# Patient Record
Sex: FEMALE | Race: WHITE
[De-identification: names, ages, dates, MRNs, and addresses within clinical notes are randomized per-mention and may not be internally consistent; named-entity substitution may affect disease eponyms.]

---

## 2017-09-26 ENCOUNTER — HOSPITAL ENCOUNTER (INPATIENT)
Dept: HOSPITAL 45 - C.EDB | Age: 22
LOS: 3 days | Discharge: HOME | DRG: 885 | End: 2017-09-29
Attending: PSYCHIATRY & NEUROLOGY | Admitting: PSYCHIATRY & NEUROLOGY
Payer: COMMERCIAL

## 2017-09-26 VITALS — TEMPERATURE: 98.42 F | DIASTOLIC BLOOD PRESSURE: 69 MMHG | SYSTOLIC BLOOD PRESSURE: 108 MMHG | HEART RATE: 87 BPM

## 2017-09-26 VITALS
WEIGHT: 157.63 LBS | WEIGHT: 157.63 LBS | HEIGHT: 65 IN | BODY MASS INDEX: 26.26 KG/M2 | HEIGHT: 65 IN | BODY MASS INDEX: 26.26 KG/M2

## 2017-09-26 VITALS — OXYGEN SATURATION: 99 %

## 2017-09-26 DIAGNOSIS — F41.9: ICD-10-CM

## 2017-09-26 DIAGNOSIS — F32.9: ICD-10-CM

## 2017-09-26 DIAGNOSIS — F20.9: Primary | ICD-10-CM

## 2017-09-26 LAB
ALP SERPL-CCNC: 83 U/L (ref 45–117)
ALT SERPL-CCNC: 18 U/L (ref 12–78)
ANION GAP SERPL CALC-SCNC: 8 MMOL/L (ref 3–11)
APAP SERPL-MCNC: < 2 UG/ML (ref 10–30)
APPEARANCE UR: CLEAR
AST SERPL-CCNC: 12 U/L (ref 15–37)
BASOPHILS # BLD: 0.01 K/UL (ref 0–0.2)
BASOPHILS NFR BLD: 0.1 %
BENZODIAZ UR-MCNC: (no result) UG/L
BILIRUB UR-MCNC: (no result) MG/DL
BUN SERPL-MCNC: 5 MG/DL (ref 7–18)
BUN/CREAT SERPL: 6.3 (ref 10–20)
CALCIUM SERPL-MCNC: 8.7 MG/DL (ref 8.5–10.1)
CHLORIDE SERPL-SCNC: 105 MMOL/L (ref 98–107)
CO2 SERPL-SCNC: 27 MMOL/L (ref 21–32)
COLOR UR: (no result)
COMPLETE: YES
CREAT CL PREDICTED SERPL C-G-VRATE: 104.6 ML/MIN
CREAT SERPL-MCNC: 0.83 MG/DL (ref 0.6–1.2)
EOSINOPHIL NFR BLD AUTO: 248 K/UL (ref 130–400)
GLUCOSE SERPL-MCNC: 122 MG/DL (ref 70–99)
HCT VFR BLD CALC: 41.1 % (ref 37–47)
IG%: 0.1 %
IMM GRANULOCYTES NFR BLD AUTO: 37.3 %
LYMPHOCYTES # BLD: 2.55 K/UL (ref 1.2–3.4)
MANUAL MICROSCOPIC REQUIRED?: NO
MCH RBC QN AUTO: 28.9 PG (ref 25–34)
MCHC RBC AUTO-ENTMCNC: 33.6 G/DL (ref 32–36)
MCV RBC AUTO: 86.2 FL (ref 80–100)
MONOCYTES NFR BLD: 10.1 %
NEUTROPHILS # BLD AUTO: 1.9 %
NEUTROPHILS NFR BLD AUTO: 50.5 %
NITRITE UR QL STRIP: (no result)
PCP UR-MCNC: (no result) UG/L
PH UR STRIP: 5 [PH] (ref 4.5–7.5)
PMV BLD AUTO: 9 FL (ref 7.4–10.4)
POTASSIUM SERPL-SCNC: 3.7 MMOL/L (ref 3.5–5.1)
RBC # BLD AUTO: 4.77 M/UL (ref 4.2–5.4)
REVIEW REQ?: NO
SODIUM SERPL-SCNC: 140 MMOL/L (ref 136–145)
SP GR UR STRIP: 1.02 (ref 1–1.03)
TSH SERPL-ACNC: 1.85 UIU/ML (ref 0.3–4.5)
URINE BILL WITH OR WITHOUT MIC: (no result)
URINE EPITHELIAL CELL AUTO: >30 /LPF (ref 0–5)
UROBILINOGEN UR-MCNC: (no result) MG/DL
WBC # BLD AUTO: 6.84 K/UL (ref 4.8–10.8)
ZZUR CULT IF INDIC CLEAN CATCH: YES

## 2017-09-26 RX ADMIN — QUETIAPINE SCH MG: 200 TABLET, EXTENDED RELEASE ORAL at 21:34

## 2017-09-26 RX ADMIN — CITALOPRAM HYDROBROMIDE SCH MG: 20 TABLET ORAL at 09:01

## 2017-09-26 NOTE — EMERGENCY ROOM VISIT NOTE
History


Report prepared by Katherine:  aNt Clark


Under the Supervision of:  Dr. Jeanna Marte M.D.


First contact with patient:  03:22


Chief Complaint:  MENTAL HEALTH EVALUATION


Stated Complaint:  need psych [facility] stay- schizophrenia





History of Present Illness


The patient is a 22 year old female who presents to the Emergency Room with 

complaints of a mental health evaluation. The patient states that she has 

schizophrenia and that she has been off of her medications for two weeks. The 

patient reports that her medication makes her tired so she stopped taking them 

so she could stay up later to do school work. She reports that she took an 

extra pill that she had 3 days ago. She reports that she has also been 

hallucinating clowns recently. The patient denies substance abuse and suicidal 

thoughts.





   Source of History:  patient


   Onset:  today


   Position:  other (global)


   Quality:  other (mental health evaluation )


   Timing:  constant


Note:


additional symptom: hallucinations 


denies: suicidal thoughts





Review of Systems


See HPI for pertinent positives & negatives. A total of 10 systems reviewed and 

were otherwise negative.





Past Medical & Surgical


Medical Problems:


(1) Depression


(2) Schizophrenia


Social History Problems:


(1) Sexually active








Family History





Patient reports no known family medical history.





Social History


Smoking Status:  Never Smoker


Drug Use:  none


Marital Status:  single


Housing Status:  lives with roommate


Occupation Status:  [employer] student





Current/Historical Medications


Scheduled


Citalopram Hydrobromide (Citalopram Hydrobromide), 10 MG PO QAM


Quetiapine Fumarate (Seroquel Xr), 300 MG PO HS





Scheduled PRN


Epinephrine (Epipen), 0.3 MG IM UD PRN for ALLERGIC REACTION


Lorazepam (Ativan), 0.5 MG PO Q6H PRN for Anxiety


Quetiapine Fumarate (Seroquel), 25 MG PO BID PRN for stress





Allergies


Coded Allergies:  


     Shellfish (Verified  Allergy, Severe, flushed and sob, 9/26/17)


 HAS USED EPIPEN WITH REACTION


     Risperidone (Verified  Allergy, Mild, Drops BP, 9/26/17)





Physical Exam


Vital Signs











  Date Time  Temp Pulse Resp B/P (MAP) Pulse Ox O2 Delivery O2 Flow Rate FiO2


 


9/26/17 03:06 36.4 86 20 119/85 99 Room Air  











Physical Exam


Vital signs reviewed.





General: Well-appearing female, in no significant distress. 





HEENT: No scleral icterus, PERRLA, neck supple.  Atraumatic.





Cardiovascular: Regular rate and rhythm, no extra sounds.





Pulmonary: Clear to auscultation bilaterally, normal work of breathing.





Abdomen: Soft, nontender, nondistended, positive bowel sounds.





Musculoskeletal: Atraumatic, no peripheral edema.





Neurologic: Patient awake alert and oriented x 3, full strength in all 4 

extremities.  Cranial nerves 2 through 12 grossly intact.





Skin: Warm, dry, no rash 





Psych: Negative suicidal, negative homicidal.





Medical Decision & Procedures


Laboratory Results


9/26/17 03:28








Red Blood Count 4.77, Mean Corpuscular Volume 86.2, Mean Corpuscular Hemoglobin 

28.9, Mean Corpuscular Hemoglobin Concent 33.6, Mean Platelet Volume 9.0, 

Neutrophils (%) (Auto) 50.5, Lymphocytes (%) (Auto) 37.3, Monocytes (%) (Auto) 

10.1, Eosinophils (%) (Auto) 1.9, Basophils (%) (Auto) 0.1, Neutrophils # (Auto

) 3.45, Lymphocytes # (Auto) 2.55, Monocytes # (Auto) 0.69, Eosinophils # (Auto

) 0.13, Basophils # (Auto) 0.01





9/26/17 03:28

















Test


  9/26/17


03:28 9/26/17


04:15 9/26/17


04:31


 


White Blood Count


  6.84 K/uL


(4.8-10.8) 


  


 


 


Red Blood Count


  4.77 M/uL


(4.2-5.4) 


  


 


 


Hemoglobin


  13.8 g/dL


(12.0-16.0) 


  


 


 


Hematocrit 41.1 % (37-47)   


 


Mean Corpuscular Volume


  86.2 fL


() 


  


 


 


Mean Corpuscular Hemoglobin


  28.9 pg


(25-34) 


  


 


 


Mean Corpuscular Hemoglobin


Concent 33.6 g/dl


(32-36) 


  


 


 


Platelet Count


  248 K/uL


(130-400) 


  


 


 


Mean Platelet Volume


  9.0 fL


(7.4-10.4) 


  


 


 


Neutrophils (%) (Auto) 50.5 %   


 


Lymphocytes (%) (Auto) 37.3 %   


 


Monocytes (%) (Auto) 10.1 %   


 


Eosinophils (%) (Auto) 1.9 %   


 


Basophils (%) (Auto) 0.1 %   


 


Neutrophils # (Auto)


  3.45 K/uL


(1.4-6.5) 


  


 


 


Lymphocytes # (Auto)


  2.55 K/uL


(1.2-3.4) 


  


 


 


Monocytes # (Auto)


  0.69 K/uL


(0.11-0.59) 


  


 


 


Eosinophils # (Auto)


  0.13 K/uL


(0-0.5) 


  


 


 


Basophils # (Auto)


  0.01 K/uL


(0-0.2) 


  


 


 


RDW Standard Deviation


  39.7 fL


(36.4-46.3) 


  


 


 


RDW Coefficient of Variation


  12.6 %


(11.5-14.5) 


  


 


 


Immature Granulocyte % (Auto) 0.1 %   


 


Immature Granulocyte # (Auto)


  0.01 K/uL


(0.00-0.02) 


  


 


 


Anion Gap


  8.0 mmol/L


(3-11) 


  


 


 


Est Creatinine Clear Calc


Drug Dose 104.6 ml/min 


  


  


 


 


Estimated GFR (


American) 115.2 


  


  


 


 


Estimated GFR (Non-


American 99.4 


  


  


 


 


BUN/Creatinine Ratio 6.3 (10-20)   


 


Calcium Level


  8.7 mg/dl


(8.5-10.1) 


  


 


 


Total Bilirubin


  0.4 mg/dl


(0.2-1) 


  


 


 


Direct Bilirubin


  < 0.1 mg/dl


(0-0.2) 


  


 


 


Aspartate Amino Transf


(AST/SGOT) 12 U/L (15-37) 


  


  


 


 


Alanine Aminotransferase


(ALT/SGPT) 18 U/L (12-78) 


  


  


 


 


Alkaline Phosphatase


  83 U/L


() 


  


 


 


Total Protein


  7.3 gm/dl


(6.4-8.2) 


  


 


 


Albumin


  3.9 gm/dl


(3.4-5.0) 


  


 


 


Thyroid Stimulating Hormone


(TSH) 1.850 uIu/ml


(0.300-4.500) 


  


 


 


Salicylates Level


  < 1.7 mg/dl


(2.8-20) 


  


 


 


Acetaminophen Level


  < 2 ug/ml


(10-30) 


  


 


 


Ethyl Alcohol mg/dL


  < 3.0 mg/dl


(0-3) 


  


 


 


Urine Color  DK YELLOW  


 


Urine Appearance  CLEAR (CLEAR)  


 


Urine pH  5.0 (4.5-7.5)  


 


Urine Specific Gravity


  


  1.022


(1.000-1.030) 


 


 


Urine Protein  NEG (NEG)  


 


Urine Glucose (UA)  NEG (NEG)  


 


Urine Ketones  TRACE (NEG)  


 


Urine Occult Blood  NEG (NEG)  


 


Urine Nitrite  NEG (NEG)  


 


Urine Bilirubin  NEG (NEG)  


 


Urine Urobilinogen  NEG (NEG)  


 


Urine Leukocyte Esterase  MODERATE (NEG)  


 


Urine WBC (Auto)  >30 /hpf (0-5)  


 


Urine RBC (Auto)  0-4 /hpf (0-4)  


 


Urine Hyaline Casts (Auto)  1-5 /lpf (0-5)  


 


Urine Epithelial Cells (Auto)  >30 /lpf (0-5)  


 


Urine Bacteria (Auto)  1+ (NEG)  


 


Urine Opiates Screen  NEG (NEG)  


 


Urine Methadone, Qualitative  NEG (NEG)  


 


Urine Barbiturates  NEG (NEG)  


 


Urine Phencyclidine (PCP)


Level 


  NEG (NEG) 


  


 


 


Ur


Amphetamine/Methamphetamine 


  NEG (NEG) 


  


 


 


MDMA (Ecstasy) Screen  NEG (NEG)  


 


Urine Benzodiazepines Screen  NEG (NEG)  


 


Urine Cocaine Metabolite  NEG (NEG)  


 


Urine Marijuana (THC)  NEG (NEG)  


 


Urine Pregnancy Test   NEG (NEG) 





Laboratory results per my review.





ED Course


0329: Past medical records reviewed. The patient was evaluated in room A7. A 

complete history and physical examination was performed. 





0600: Upon reevaluation, the patient is resting comfortably.  She verbalized 

agreement of the treatment plan. I spoke with Adarsh Colvin. The patient will be 

evaluated for further management and care.





Medical Decision








Differential diagnosis:





Etiologies such as mood disorder, infection, hypoglycemia, electrolyte 

abnormalities, cardiac sources, intracerebral event, toxicologic, neurologic, 

as well as others were entertained.








This patient was evaluated and appeared to be in no significant distress.  

Patient was medically cleared and evaluated by [facility].  She was accepted for 

inpatient management.  Patient is aware of the plan and agrees.





Medication Reconcilliation


Current Medication List:  was personally reviewed by me





Blood Pressure Screening


Patient's blood pressure:  Normal blood pressure





Consults


Time Called:  0530


Consulting Physician:  Adarsh Colvin


Returned Call:  0600


I reviewed the patient's case with [facility].  The patient will be evaluated 

for further management.





Impression





 Primary Impression:  


 Schizophrenia





Scribe Attestation


The scribe's documentation has been prepared under my direction and personally 

reviewed by me in its entirety. I confirm that the note above accurately 

reflects all work, treatment, procedures, and medical decision making performed 

by me.





Departure Information


Dispostion


Being Evaluated By Hospitalist





[facility] (PCP)





Patient Instructions


[facility]
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
## 2017-09-27 VITALS — DIASTOLIC BLOOD PRESSURE: 68 MMHG | HEART RATE: 65 BPM | SYSTOLIC BLOOD PRESSURE: 106 MMHG | TEMPERATURE: 97.52 F

## 2017-09-27 LAB
CHOLEST/HDLC SERPL: 3.9 {RATIO}
GLUCOSE UR QL: 45 MG/DL
KETONES UR QL STRIP: 105 MG/DL
NITRITE UR QL STRIP: 135 MG/DL (ref 0–150)
PH UR: 177 MG/DL (ref 0–200)
VERY LOW DENSITY LIPOPROT CALC: 27 MG/DL

## 2017-09-27 RX ADMIN — CITALOPRAM HYDROBROMIDE SCH MG: 20 TABLET ORAL at 10:17

## 2017-09-27 RX ADMIN — QUETIAPINE SCH MG: 200 TABLET, EXTENDED RELEASE ORAL at 21:21

## 2017-09-27 NOTE — PSYCHIATRIC PROGRESS NOTES
Progress Note


Date of Service


Sep 27, 2017.





Interval History


Jacqueline Mirza is a 22-year-old female who currently lives in [place] 

with 3 roommates, has a history of schizophrenia, and presented to the 

emergency room with worsening hallucinations in the context of going off her 

medications 2 weeks ago due to sedation that interfered with studying.  She was 

admitted voluntarily.





Chief Complaint


"I'm just tired. ".





Subjective


Patient was seen & assessed interval progress reviewed with Treatment Team.  

The patient slept most of yesterday and is still in bed at noon.  She awakens 

to verbal, and says that she feels tired, having just restarted her meds.   She 

reports that her mood is "good" as is her thinking.  She denies aud/vis 

hallucinations, delusions or paranoia.  She did not get up for breakfast today, 

but did eat 2 meals yesterday.  She denies any physical complaints or side 

effects to meds other than sedation.





Review of Systems


Constitutional:  + fatigue


ENT:  No hearing loss, No unusual epistaxis, No nasal symptoms, No sore throat, 

No tinnitus, No dental problems, No trouble swallowing, No problem reported


Respiratory:  No cough, No sputum, No wheezing, No shortness of breath, No 

dyspnea on exertion, No dyspnea at rest, No hemoptysis, No problem reported


Cardiovascular:  No chest pain, No orthopnea, No PND, No edema, No claudication

, No palpitations, No problem reported


Abdomen:  No pain, No nausea, No vomiting, No diarrhea, No constipation, No GI 

bleeding, No problem reported


Musculoskeletal:  No joint pain, No muscle pain, No swelling, No calf pain, No 

problem reported


Neurologic:  No memory loss, No paralysis, No weakness, No numbness/tingling, 

No vertigo, No balance problems, No problem reported


Psychiatric:  No depression symptoms, No anhedonism, No anxiety, No insomnia, 

No substance abuse, No problem reported


Integumentary:  No rash, No itch, No new/changing skin lesions, No color change

, No bleeding, No problem reported





Sleep Information


Total Hours of Sleep:  6.50





Meal Information


Percent of Breakfast Consumed:  100


Percent of Dinner Consumed:  95





Mental Status Exam


During interview pt is:  cooperative


Appearance:  appropriately dressed, disheveled, appeared stated age


Eye contact is:  poor (tired with eyes closed)


Motor behavior is:  no abnormal motor movements


Speech:  normal in rate, rhythm & volume


Affect:  euthymic


Mood is:  other ("good")


Thought process:  goal directed, linear, logical, clear, coherent


Thought content:  reality based without delusions


Suicidal thought are:  denied


Homicidal thoughts are:  denied


Hallucinations:  denies auditory, denies visual


Cognition:  memory grossly intact, attention grossly intact, language grossly 

intact


Intelligence estimated to be:  consistent with level of education


Insight:  fair


Judgement:  fair





Impression


Has slept for most of the  last 24 hrs.  Encouraged to challenge herself to get 

out of bed to prevent insomnia tonight.  Will continue current meds





Plan





(1) Schizophrenia


9/26    -Resume home doses of quetiapine XR 300mg qhs and quetiapine 25mg bid 

prn. 


   -Pt states she has not had fasting labs, Check FLP and glucose tomorrow AM 

for monitoring on an atypical.


   -Get records from Dr. Reeves and confirm doses of medications. Coordinate care

, 


   -Offer family meeting, which she is declining.


   -Encourage attendance and participation in groups and therapy.


9/27


   - Continue current meds


   - Encourage good sleep/wake cycles





(2) Depression


Resume home dose of citalopram.





(3) Anxiety disorder


Resume home dose of citalopram, lorazepam prn, and quetiapine prn.





(4) Sexually active


Sexually active and not on contraception. Using condoms and "pull out method." 

Encouraged to follow up with OB at [facility] as she is exploring IUDs, and to use 

barrier protection in the meantime. 








Discharge / Aftercare Planning


Psychiatrist:  


   Name:  Dr Reeves


   Phone Number:  [phone]


   Date of Appointment:  Oct 5, 2017


   Time of Appointment:  4:30pm


Therapist:  


   Name:  none


:  


   Name:  none





Visit Code


E&M Code:  02304





Inventory Assets


Strengths:


intelligence, well educated about diagnosis, committed to treatment, good 

rapport with psychiatrist





Risk Factors Assessment


:  Yes


/single/:  Yes


Higher / Fall in social status:  No


Health problems:  No


Mental Health Diagnoses:  Yes


Substance use disorders:  No


Previous attempt:  Yes


Previous psychiatric stay:  Yes


Hopelessness:  No


Smoker:  No





Protective Factors Assessment


:  No


Responsible for young children:  No


Employed:  Yes


Stable relationships:  Yes


Supportive family:  No


Good rapport with provider:  Yes





Data


Vital Signs Last 24 Hrs:











  Date Time  Temp Pulse Resp B/P (MAP) Pulse Ox O2 Delivery O2 Flow Rate FiO2


 


9/27/17 07:01 36.4 65 16 106/68    





  99  118/68    








Meds Administered Last 24 Hrs:





Meds Administered (Past 24Hrs)








 Medications


  (Trade)  Dose


 Ordered  Sig/Chaitanya


 Route  Start Time


 Stop Time Status Last Admin


Dose Admin


 


 Citalopram


 Hydrobromide


  (celeXA TAB)  10 mg  QAM


 PO  9/26/17 09:00


 10/26/17 08:59  9/27/17 10:17


10 MG


 


 Quetiapine


 Fumarate


  (seroQUEL XR TAB)  300 mg  HS


 PO  9/26/17 22:00


 10/26/17 21:59  9/26/17 21:34


300 MG








Lab Results Last 24 Hrs:





Last 24 Hours








Test


  9/27/17


08:08


 


Fasting Glucose 78 mg/dl 


 


Triglycerides Level 135 mg/dl 


 


Cholesterol Level 177 mg/dl 


 


HDL Cholesterol 45 mg/dl 


 


LDL Cholesterol, Calculated 105 mg/dl 


 


VLDL Cholesterol, Calculated 27 mg/dl 


 


Cholesterol/HDL Ratio 3.9

## 2017-09-28 VITALS — TEMPERATURE: 97.34 F | SYSTOLIC BLOOD PRESSURE: 98 MMHG | DIASTOLIC BLOOD PRESSURE: 66 MMHG | HEART RATE: 89 BPM

## 2017-09-28 RX ADMIN — CITALOPRAM HYDROBROMIDE SCH MG: 20 TABLET ORAL at 09:14

## 2017-09-28 NOTE — PSYCHIATRIC PROGRESS NOTES
Progress Note


Date of Service


Sep 28, 2017.





Interval History


Jacqueline Mirza is a 22-year-old female who currently lives in [place] 

with 3 roommates, has a history of schizophrenia, and presented to the 

emergency room with worsening hallucinations in the context of going off her 

medications 2 weeks ago due to sedation that interfered with studying.  She was 

admitted voluntarily.





Chief Complaint


"I'm tired from starting back on my meds. ".





Subjective


Patient was seen & assessed interval progress reviewed with Treatment Team.  

The patient is again in bed when approached for the interview.  She says that 

getting back on her meds is making her tired.  Staff report that she was in bed 

most of the day yesterday, but was able to get up and attend groups last 

evening.  Today she repeats what she said yesterday, that her mood is good, she 

denies aud/vis hallucinations or paranoia, and never had any SI/HI.  She 

believes that she just came here to get her meds restarted and will likely be 

ready to go by tomorrow.





Review of Systems


Constitutional:  + fatigue


ENT:  No hearing loss, No unusual epistaxis, No nasal symptoms, No sore throat, 

No tinnitus, No dental problems, No trouble swallowing, No problem reported


Respiratory:  No cough, No sputum, No wheezing, No shortness of breath, No 

dyspnea on exertion, No dyspnea at rest, No hemoptysis, No problem reported


Cardiovascular:  No chest pain, No orthopnea, No PND, No edema, No claudication

, No palpitations, No problem reported


Abdomen:  No pain, No nausea, No vomiting, No diarrhea, No constipation, No GI 

bleeding, No problem reported


Musculoskeletal:  No joint pain, No muscle pain, No swelling, No calf pain, No 

problem reported


Neurologic:  No memory loss, No paralysis, No weakness, No numbness/tingling, 

No vertigo, No balance problems, No problem reported


Psychiatric:  No depression symptoms, No anhedonism, No anxiety, No insomnia, 

No substance abuse, No problem reported


Integumentary:  No rash, No itch, No new/changing skin lesions, No color change

, No bleeding, No problem reported





Sleep Information


Total Hours of Sleep:  7.75





Meal Information


Percent of Breakfast Consumed:  100


Percent of Lunch Consumed:  100


Percent of Dinner Consumed:  75





Mental Status Exam


During interview pt is:  cooperative


Appearance:  appropriately dressed, disheveled, appeared stated age


Eye contact is:  poor (tired with eyes closed)


Motor behavior is:  no abnormal motor movements


Speech:  normal in rate, rhythm & volume


Affect:  euthymic


Mood is:  other ("good")


Thought process:  goal directed, linear, logical, clear, coherent


Thought content:  reality based without delusions


Suicidal thought are:  denied


Homicidal thoughts are:  denied


Hallucinations:  denies auditory, denies visual


Cognition:  memory grossly intact, attention grossly intact, language grossly 

intact


Intelligence estimated to be:  consistent with level of education


Insight:  fair


Judgement:  fair





Impression


 Is again too sedated to get up for groups.  Will move Seroquel XR to 2000.  

She is denying being symptomatic in any way and will be able to discharge 

tomorrow





Plan





(1) Schizophrenia


9/26    -Resume home doses of quetiapine XR 300mg qhs and quetiapine 25mg bid 

prn. 


   -Pt states she has not had fasting labs, Check FLP and glucose tomorrow AM 

for monitoring on an atypical.


   -Get records from Dr. Reeves and confirm doses of medications. Coordinate care

, 


   -Offer family meeting, which she is declining.


   -Encourage attendance and participation in groups and therapy.


9/27


   - Continue current meds


   - Encourage good sleep/wake cycles


9/28


   - Move Seroquel XR to 2000





(2) Depression


Resume home dose of citalopram.





(3) Anxiety disorder


Resume home dose of citalopram, lorazepam prn, and quetiapine prn.





(4) Sexually active


Sexually active and not on contraception. Using condoms and "pull out method." 

Encouraged to follow up with OB at [facility] as she is exploring IUDs, and to use 

barrier protection in the meantime. 








Discharge / Aftercare Planning


Psychiatrist:  


   Name:  Dr Reeves


   Phone Number:  [phone]


   Date of Appointment:  Oct 5, 2017


   Time of Appointment:  4:30pm


Therapist:  


   Name:  none


:  


   Name:  none





Visit Code


E&M Code:  32937





Inventory Assets


Strengths:


intelligence, well educated about diagnosis, committed to treatment, good 

rapport with psychiatrist





Risk Factors Assessment


:  Yes


/single/:  Yes


Higher / Fall in social status:  No


Health problems:  No


Mental Health Diagnoses:  Yes


Substance use disorders:  No


Previous attempt:  Yes


Previous psychiatric stay:  Yes


Hopelessness:  No


Smoker:  No





Protective Factors Assessment


:  No


Responsible for young children:  No


Employed:  Yes


Stable relationships:  Yes


Supportive family:  No


Good rapport with provider:  Yes





Data


Vital Signs Last 24 Hrs:











  Date Time  Temp Pulse Resp B/P (MAP) Pulse Ox O2 Delivery O2 Flow Rate FiO2


 


9/28/17 07:02 36.3 66 16 100/66    





  89  98/66    








Meds Administered Last 24 Hrs:





Meds Administered (Past 24Hrs)








 Medications


  (Trade)  Dose


 Ordered  Sig/Chaitanya


 Route  Start Time


 Stop Time Status Last Admin


Dose Admin


 


 Quetiapine


 Fumarate


  (seroQUEL XR TAB)  300 mg  HS


 PO  9/26/17 22:00


 10/26/17 21:59  9/27/17 21:21


300 MG








Lab Results Last 24 Hrs:


9/26/17 03:28








Red Blood Count 4.77, Mean Corpuscular Volume 86.2, Mean Corpuscular Hemoglobin 

28.9, Mean Corpuscular Hemoglobin Concent 33.6, Mean Platelet Volume 9.0, 

Neutrophils (%) (Auto) 50.5, Lymphocytes (%) (Auto) 37.3, Monocytes (%) (Auto) 

10.1, Eosinophils (%) (Auto) 1.9, Basophils (%) (Auto) 0.1, Neutrophils # (Auto

) 3.45, Lymphocytes # (Auto) 2.55, Monocytes # (Auto) 0.69, Eosinophils # (Auto

) 0.13, Basophils # (Auto) 0.01





9/26/17 03:28

















Test


  9/26/17


03:28 9/26/17


04:15 9/26/17


04:31 9/27/17


08:08


 


White Blood Count


  6.84 K/uL


(4.8-10.8) 


  


  


 


 


Red Blood Count


  4.77 M/uL


(4.2-5.4) 


  


  


 


 


Hemoglobin


  13.8 g/dL


(12.0-16.0) 


  


  


 


 


Hematocrit 41.1 % (37-47)    


 


Mean Corpuscular Volume


  86.2 fL


() 


  


  


 


 


Mean Corpuscular Hemoglobin


  28.9 pg


(25-34) 


  


  


 


 


Mean Corpuscular Hemoglobin


Concent 33.6 g/dl


(32-36) 


  


  


 


 


Platelet Count


  248 K/uL


(130-400) 


  


  


 


 


Mean Platelet Volume


  9.0 fL


(7.4-10.4) 


  


  


 


 


Neutrophils (%) (Auto) 50.5 %    


 


Lymphocytes (%) (Auto) 37.3 %    


 


Monocytes (%) (Auto) 10.1 %    


 


Eosinophils (%) (Auto) 1.9 %    


 


Basophils (%) (Auto) 0.1 %    


 


Neutrophils # (Auto)


  3.45 K/uL


(1.4-6.5) 


  


  


 


 


Lymphocytes # (Auto)


  2.55 K/uL


(1.2-3.4) 


  


  


 


 


Monocytes # (Auto)


  0.69 K/uL


(0.11-0.59) 


  


  


 


 


Eosinophils # (Auto)


  0.13 K/uL


(0-0.5) 


  


  


 


 


Basophils # (Auto)


  0.01 K/uL


(0-0.2) 


  


  


 


 


RDW Standard Deviation


  39.7 fL


(36.4-46.3) 


  


  


 


 


RDW Coefficient of Variation


  12.6 %


(11.5-14.5) 


  


  


 


 


Immature Granulocyte % (Auto) 0.1 %    


 


Immature Granulocyte # (Auto)


  0.01 K/uL


(0.00-0.02) 


  


  


 


 


Anion Gap


  8.0 mmol/L


(3-11) 


  


  


 


 


Est Creatinine Clear Calc


Drug Dose 104.6 ml/min 


  


  


  


 


 


Estimated GFR (


American) 115.2 


  


  


  


 


 


Estimated GFR (Non-


American 99.4 


  


  


  


 


 


BUN/Creatinine Ratio 6.3 (10-20)    


 


Calcium Level


  8.7 mg/dl


(8.5-10.1) 


  


  


 


 


Total Bilirubin


  0.4 mg/dl


(0.2-1) 


  


  


 


 


Direct Bilirubin


  < 0.1 mg/dl


(0-0.2) 


  


  


 


 


Aspartate Amino Transf


(AST/SGOT) 12 U/L (15-37) 


  


  


  


 


 


Alanine Aminotransferase


(ALT/SGPT) 18 U/L (12-78) 


  


  


  


 


 


Alkaline Phosphatase


  83 U/L


() 


  


  


 


 


Total Protein


  7.3 gm/dl


(6.4-8.2) 


  


  


 


 


Albumin


  3.9 gm/dl


(3.4-5.0) 


  


  


 


 


Thyroid Stimulating Hormone


(TSH) 1.850 uIu/ml


(0.300-4.500) 


  


  


 


 


Salicylates Level


  < 1.7 mg/dl


(2.8-20) 


  


  


 


 


Acetaminophen Level


  < 2 ug/ml


(10-30) 


  


  


 


 


Ethyl Alcohol mg/dL


  < 3.0 mg/dl


(0-3) 


  


  


 


 


Urine Color  DK YELLOW   


 


Urine Appearance  CLEAR (CLEAR)   


 


Urine pH  5.0 (4.5-7.5)   


 


Urine Specific Gravity


  


  1.022


(1.000-1.030) 


  


 


 


Urine Protein  NEG (NEG)   


 


Urine Glucose (UA)  NEG (NEG)   


 


Urine Ketones  TRACE (NEG)   


 


Urine Occult Blood  NEG (NEG)   


 


Urine Nitrite  NEG (NEG)   


 


Urine Bilirubin  NEG (NEG)   


 


Urine Urobilinogen  NEG (NEG)   


 


Urine Leukocyte Esterase  MODERATE (NEG)   


 


Urine WBC (Auto)  >30 /hpf (0-5)   


 


Urine RBC (Auto)  0-4 /hpf (0-4)   


 


Urine Hyaline Casts (Auto)  1-5 /lpf (0-5)   


 


Urine Epithelial Cells (Auto)  >30 /lpf (0-5)   


 


Urine Bacteria (Auto)  1+ (NEG)   


 


Urine Opiates Screen  NEG (NEG)   


 


Urine Methadone, Qualitative  NEG (NEG)   


 


Urine Barbiturates  NEG (NEG)   


 


Urine Phencyclidine (PCP)


Level 


  NEG (NEG) 


  


  


 


 


Ur


Amphetamine/Methamphetamine 


  NEG (NEG) 


  


  


 


 


MDMA (Ecstasy) Screen  NEG (NEG)   


 


Urine Benzodiazepines Screen  NEG (NEG)   


 


Urine Cocaine Metabolite  NEG (NEG)   


 


Urine Marijuana (THC)  NEG (NEG)   


 


Urine Pregnancy Test   NEG (NEG)  


 


Fasting Glucose


  


  


  


  78 mg/dl


(70-99)


 


Triglycerides Level


  


  


  


  135 mg/dl


(0-150)


 


Cholesterol Level


  


  


  


  177 mg/dl


(0-200)


 


HDL Cholesterol    45 mg/dl 


 


LDL Cholesterol, Calculated    105 mg/dl 


 


VLDL Cholesterol, Calculated    27 mg/dl 


 


Cholesterol/HDL Ratio    3.9 














 Date/Time


Source Procedure


Growth Status


 


 


 9/26/17 04:15


Urine , Clean Catch Urine Culture - Final


THREE TYPES OF ORGANSIMS PRESENT, ALL... Complete

## 2017-09-29 VITALS — HEART RATE: 72 BPM | TEMPERATURE: 97.52 F | SYSTOLIC BLOOD PRESSURE: 99 MMHG | DIASTOLIC BLOOD PRESSURE: 64 MMHG

## 2017-09-29 RX ADMIN — CITALOPRAM HYDROBROMIDE SCH MG: 20 TABLET ORAL at 09:30

## 2017-09-29 NOTE — DISCHARGE INSTRUCTIONS
Discharge Information


Report Includes


Report will include the:  Discharge Instructions & Summary





Admission


Admission Date / Time:  Sep 26, 2017 at 06:20


Reason for Admission:  Schizophrenic With Psychosis





Discharge


Discharge Diagnosis / Problem:  Schizophrenia


Condition at Discharge:  Good





Discharge Goals


Goal(s):  Decrease discomfort, Improve disease control, Prevent Disease 

Progression





Activity Recommendations


Activity Limitations:  resume your previous activity





.





Instructions / Follow-Up


Instructions / Follow-Up


.





SPECIAL CARE INSTRUCTIONS:





1.  Follow through with your scheduled aftercare appointments.  If unable to 

keep an appointment, please call to reschedule.





2.  Take your medication only as prescribed.  Medication should not be changed 

or stopped without the approval of your doctor.  In the event of worsening 

symptoms or concerns about side effects, contact your doctor immediately.





3.  Utilize new healthy coping skills, anger management skills, and stress 

management skills learned during your hospitalization.  Journal feelings and 

process them with a support person.  Identify stressors or situations that may 

result in relapse, deterioration or inappropriate behaviors and develop a plan 

to deal with those issues.





4.  If your coping skills are ineffective and you are in crisis, contact your 

outpatient providers for direction.  If unable to reach your providers, please 

call the  CAN HELP LINE AT [phone] or go to the closest Emergency Room.





5.  Avoid alcohol and un-prescribed drugs.





6.  You have been provided with the Mental Health Advance Directives Pamphlet 

for your review.








AFTERCARE APPOINTMENTS: 





*  Please call your insurance company prior to your scheduled appointment to 

confirm your aftercare providers are covered.  Take your insurance information 

to your appointments.





.





Discharge / Aftercare Planning


Psychiatrist:  


   Name:  Dr Reeves


   Phone Number:  [phone]


   Date of Appointment:  Oct 5, 2017


   Time of Appointment:  4:30pm


Therapist:  


   Name Of Therapist:  none


:  


   Name:  none





.





Follow-Up Care


Plan for Follow-Up Care:


She will see her regular psychiatrist next week





Current Hospital Diet


Patient's current hospital diet: Regular Diet





Discharge Diet


Recommended Diet:  Regular Diet





Procedures


Procedures Performed:  No





Pending Studies


Pending Studies at Discharge:  No





Medical Emergencies








.


Who to Call and When:





Medical Emergencies:  


For questions or emergencies related to your hospital stay, please contact the 

Inpatient Behavioral Health Unit at [phone].





A psychiatric clinician is on-call 24/7 for the Behavioral Health Unit for 

emergencies





At any time you feel your situation is an emergency, you may also call 911 

immediately.





.





Non-Emergent Contact


Non-Emergency issues call your:  Psychiatrist





Advance Directives


Existing Advance Directive:  No


Do You Have an Existing Mental:  No


Existing Living Will:  No


Existing Power of :  No


Advance Directives Info Given:  To Pt/S.O.


Advance Directives Reason:


Declines as Mental Health Visit.





Discharge Summary


Admission HPI


Per the Admitting provider:


Per records, the patient presented to the ER with a friend early this morning, 

requesting admission due to exacerbation of schizophrenia. Today she was seen 

with MARYANNE Robins, which she consented to. She states she went off her 

meds because "I was stupid," and her baseline hallucinations worsened, "so I 

needed a safe place to get back on my meds." She 


She reports chronic auditory, visual, and tactile hallucinations, with AVH of 

clowns and a little girl who is stabbing her, which were interfering with her 

ability to function. She has had a hallucination of a clown for many years, 

which has been exacerbated by being off her meds and release of a new It movie 

which has been widely advertised.  She says she was not taking her prescribed 

medications because "I was working really hard," both at school and starting a 

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 She says she was also in [place] 

working with the [employer], "and I had deadlines." She says she is "really responsible

, I realize I can't stop taking my medication." She describes "a long mental 

health journey to find medications that work for her, says she likes her 

medications and says she "worked very hard to get a good treatment plan." She 

would like to resume her previous meds, citalopram 10mg, lorazepam 0.5mg prn, 

quetiapine 25mg bid prn, and quetiapine XR 300mg qhs. Reports decreased 

appetite but no weight change, restlessness, difficulty making decisions and 

focusing, irregular sleep schedule (but getting 9-12 hours a night), racing 

thoughts, and daily anxiety and stress due to above stressors. She has had 

panic attacks in the remote past, but currently well controlled. Denies current 

eating disorder, OCD, and manic symptoms. History of PTSD, still has some 

flashbacks and nightmares, but decreased and no longer requiring clonidine. 

Feels it has been helpful to talk to friends, therapist, and blog about her 

past experiences. Says she "is in a good place now." She denies SI and HI. She 

feels she "just needs to be here a couple days to get stable." She does not 

feel she needs therapy or a family meeting.





Hospital Course





(1) Schizophrenia


9/26    -Resume home doses of quetiapine XR 300mg qhs and quetiapine 25mg bid 

prn. 


   -Pt states she has not had fasting labs, Check FLP and glucose tomorrow AM 

for monitoring on an atypical.


   -Get records from Dr. Reeves and confirm doses of medications. Coordinate care

, 


   -Offer family meeting, which she is declining.


   -Encourage attendance and participation in groups and therapy.


9/27


   - Continue current meds


   - Encourage good sleep/wake cycles


9/28


   - Move Seroquel XR to 2000





(2) Depression


Resume home dose of citalopram.





(3) Anxiety disorder


Resume home dose of citalopram, lorazepam prn, and quetiapine prn.





(4) Sexually active


Sexually active and not on contraception. Using condoms and "pull out method." 

Encouraged to follow up with OB at [facility] as she is exploring IUDs, and to use 

barrier protection in the meantime. 








Risk Factors Assessment


:  Yes


/single/:  Yes


Higher / Fall in social status:  No


Health problems:  No


Mental Health Diagnoses:  Yes


Substance use disorders:  No


Previous attempt:  Yes


Previous psychiatric stay:  Yes


Hopelessness:  No


Smoker:  No





Protective Factors Assessment


:  No


Responsible for young children:  No


Employed:  Yes


Stable relationships:  Yes


Supportive family:  No


Good rapport with provider:  Yes





Day of Discharge Assessment


COURSE OF HOSPITALIZATION: The patient was admitted voluntarily and has been on 

our unit for 3 days.  She had been off of her regular psychiatric medications 

for 3 weeks and had begun to see hallucinations of clowns and movie Eclipse.  

She came into the hospital and we titrated up her regular course of 

medications.  She was significantly fatigued in the mornings, not feeling she 

can't get out of bed until afternoon.  Dosing of her Seroquel XR was moved to 

about 8 PM which is when she takes it at home, with some improvement.  She 

attended groups only in the evenings after she was able to wake up.  She 

consistently denied any suicidal or homicidal ideation.  She denied any 

auditory or visual hallucinations during her stay.  She felt sure that when her 

medications were adjusted she would be able to leave and requested to leave 

today.  She had told us that she was in school at [employer] however in 

attempting to submit to withdraw from school and her, it appears that she has 

not actually enrolled although she may be sitting in on some classes.  She 

plans to return to her outpatient providers.  She did not want to involve any 

of her family members while she was here and so no family meeting was held.





DAY OF DISCHARGE ASSESSMENT: Today the patient is requesting discharge.  She is 

worried that her pharmacy will not have the supply of Seroquel XR immediately 

and is requesting a 24-hour supply to go with her.  She again today denies any 

suicidal or homicidal thinking.  She denies auditory or visual hallucinations 

and denies any paranoia.  She reports good mood.  Gait and station are within 

normal limits.  She denies side effects to medications or physical problems.  

Eye contact is good.  Affect is restricted.  Speech is of normal rate volume 

and tone.  Thoughts are organized, goal directed, and without evidence of overt 

psychosis.  Recent and remote memory are intact per conversation.  Intelligence 

is estimated to be average.  Insight and judgment are improved over admission.





Laboratory











Test


  9/26/17


03:28 9/26/17


04:15 9/26/17


04:31 9/27/17


08:08


 


White Blood Count 6.84    


 


Red Blood Count 4.77    


 


Hemoglobin 13.8    


 


Hematocrit 41.1    


 


Mean Corpuscular Volume 86.2    


 


Mean Corpuscular Hemoglobin 28.9    


 


Mean Corpuscular Hemoglobin


Concent 33.6 


  


  


  


 


 


Platelet Count 248    


 


Mean Platelet Volume 9.0    


 


Neutrophils (%) (Auto) 50.5    


 


Lymphocytes (%) (Auto) 37.3    


 


Monocytes (%) (Auto) 10.1    


 


Eosinophils (%) (Auto) 1.9    


 


Basophils (%) (Auto) 0.1    


 


Neutrophils # (Auto) 3.45    


 


Lymphocytes # (Auto) 2.55    


 


Monocytes # (Auto) 0.69    


 


Eosinophils # (Auto) 0.13    


 


Basophils # (Auto) 0.01    


 


RDW Standard Deviation 39.7    


 


RDW Coefficient of Variation 12.6    


 


Immature Granulocyte % (Auto) 0.1    


 


Immature Granulocyte # (Auto) 0.01    


 


Sodium Level 140    


 


Potassium Level 3.7    


 


Chloride Level 105    


 


Carbon Dioxide Level 27    


 


Anion Gap 8.0    


 


Blood Urea Nitrogen 5    


 


Creatinine 0.83    


 


Est Creatinine Clear Calc


Drug Dose 104.6 


  


  


  


 


 


Estimated GFR (


American) 115.2 


  


  


  


 


 


Estimated GFR (Non-


American 99.4 


  


  


  


 


 


BUN/Creatinine Ratio 6.3    


 


Random Glucose 122    


 


Calcium Level 8.7    


 


Total Bilirubin 0.4    


 


Direct Bilirubin < 0.1    


 


Aspartate Amino Transferase


(AST) 12 


  


  


  


 


 


Alanine Aminotransferase (ALT) 18    


 


Alkaline Phosphatase 83    


 


Total Protein 7.3    


 


Albumin 3.9    


 


Thyroid Stimulating Hormone


(TSH) 1.850 


  


  


  


 


 


Salicylates Level < 1.7    


 


Acetaminophen Level < 2    


 


Ethyl Alcohol mg/dL < 3.0    


 


Urine Color  DK YELLOW   


 


Urine Appearance  CLEAR   


 


Urine pH  5.0   


 


Urine Specific Gravity  1.022   


 


Urine Protein  NEG   


 


Urine Glucose (UA)  NEG   


 


Urine Ketones  TRACE   


 


Urine Occult Blood  NEG   


 


Urine Nitrite  NEG   


 


Urine Bilirubin  NEG   


 


Urine Urobilinogen  NEG   


 


Urine Leukocyte Esterase  MODERATE   


 


Urine WBC (Auto)  >30   


 


Urine RBC (Auto)  0-4   


 


Urine Hyaline Casts (Auto)  1-5   


 


Urine Epithelial Cells (Auto)  >30   


 


Urine Bacteria (Auto)  1+   


 


Urine Synthetic Stimulants  Pending   


 


Urine Opiates Screen  NEG   


 


Urine Methadone, Qualitative  NEG   


 


Urine Barbiturates  NEG   


 


Urine Phencyclidine (PCP)


Level 


  NEG 


  


  


 


 


Ur


Amphetamine/Methamphetamine 


  NEG 


  


  


 


 


MDMA (Ecstasy) Screen  NEG   


 


Urine Benzodiazepines Screen  NEG   


 


Urine Cocaine Metabolite  NEG   


 


Cannabinoids Comment  Pending   


 


Urine Synthetic Cannabinoids  Pending   


 


Ur Synthetic Cannabinoids


Confirm 


  Pending 


  


  


 


 


Urine Marijuana (THC)  NEG   


 


Urine Pregnancy Test   NEG  


 


Fasting Glucose    78 


 


Triglycerides Level    135 


 


Cholesterol Level    177 


 


HDL Cholesterol    45 


 


LDL Cholesterol, Calculated    105 


 


VLDL Cholesterol, Calculated    27 


 


Cholesterol/HDL Ratio    3.9 











Total Time


Total Time Spent (min):  Greater than 30 minutes


Total Time Included:  examination of the patient, discharge planning, 

medication reconciliation, communication with other providers





Tobacco Cessation at Discharge


Smoking Status:  Never Smoker


FDA approved Prescription:  non-smoker

## 2017-10-03 LAB — SYNTHETIC CANNABINOIDS QL URIN: NEGATIVE

## 2018-01-12 ENCOUNTER — HOSPITAL ENCOUNTER (EMERGENCY)
Dept: HOSPITAL 45 - C.EDB | Age: 23
Discharge: HOME | End: 2018-01-12
Payer: COMMERCIAL

## 2018-01-12 VITALS
WEIGHT: 171.96 LBS | BODY MASS INDEX: 28.65 KG/M2 | BODY MASS INDEX: 28.65 KG/M2 | HEIGHT: 65 IN | HEIGHT: 65 IN | WEIGHT: 171.96 LBS

## 2018-01-12 VITALS — SYSTOLIC BLOOD PRESSURE: 96 MMHG | HEART RATE: 108 BPM | DIASTOLIC BLOOD PRESSURE: 57 MMHG | OXYGEN SATURATION: 99 %

## 2018-01-12 VITALS — TEMPERATURE: 97.34 F

## 2018-01-12 DIAGNOSIS — I88.0: Primary | ICD-10-CM

## 2018-01-12 DIAGNOSIS — Z79.899: ICD-10-CM

## 2018-01-12 DIAGNOSIS — R11.10: ICD-10-CM

## 2018-01-12 DIAGNOSIS — F20.9: ICD-10-CM

## 2018-01-12 DIAGNOSIS — J32.9: ICD-10-CM

## 2018-01-12 LAB
ALBUMIN SERPL-MCNC: 3.8 GM/DL (ref 3.4–5)
ALP SERPL-CCNC: 80 U/L (ref 45–117)
ALT SERPL-CCNC: 24 U/L (ref 12–78)
AST SERPL-CCNC: 14 U/L (ref 15–37)
BASOPHILS # BLD: 0 K/UL (ref 0–0.2)
BASOPHILS NFR BLD: 0 %
BUN SERPL-MCNC: 12 MG/DL (ref 7–18)
CALCIUM SERPL-MCNC: 8.4 MG/DL (ref 8.5–10.1)
CO2 SERPL-SCNC: 27 MMOL/L (ref 21–32)
CREAT SERPL-MCNC: 0.76 MG/DL (ref 0.6–1.2)
EOS ABS #: 0.07 K/UL (ref 0–0.5)
EOSINOPHIL NFR BLD AUTO: 267 K/UL (ref 130–400)
GLUCOSE SERPL-MCNC: 98 MG/DL (ref 70–99)
HCT VFR BLD CALC: 40 % (ref 37–47)
HGB BLD-MCNC: 13.5 G/DL (ref 12–16)
IG#: 0.01 K/UL (ref 0–0.02)
IMM GRANULOCYTES NFR BLD AUTO: 5.9 %
INFLUENZA B ANTIGEN: (no result)
LIPASE: 85 U/L (ref 73–393)
LYMPHOCYTES # BLD: 0.61 K/UL (ref 1.2–3.4)
MCH RBC QN AUTO: 29.1 PG (ref 25–34)
MCHC RBC AUTO-ENTMCNC: 33.8 G/DL (ref 32–36)
MCV RBC AUTO: 86.2 FL (ref 80–100)
MONO ABS #: 0.62 K/UL (ref 0.11–0.59)
MONOCYTES NFR BLD: 6 %
NEUT ABS #: 9.1 K/UL (ref 1.4–6.5)
NEUTROPHILS # BLD AUTO: 0.7 %
NEUTROPHILS NFR BLD AUTO: 87.3 %
PMV BLD AUTO: 9.1 FL (ref 7.4–10.4)
POTASSIUM SERPL-SCNC: 3.6 MMOL/L (ref 3.5–5.1)
PROT SERPL-MCNC: 7.8 GM/DL (ref 6.4–8.2)
RED CELL DISTRIBUTION WIDTH CV: 13.5 % (ref 11.5–14.5)
RED CELL DISTRIBUTION WIDTH SD: 42.7 FL (ref 36.4–46.3)
SODIUM SERPL-SCNC: 136 MMOL/L (ref 136–145)
WBC # BLD AUTO: 10.41 K/UL (ref 4.8–10.8)

## 2018-01-12 NOTE — EMERGENCY ROOM VISIT NOTE
History


First contact with patient:  14:14


Chief Complaint:  VOMITING


Stated Complaint:  THROWING UP,NO PD,ABD PAIN,SCHIZO-THREW UP MEDS


Nursing Triage Summary:  


c/o body aches and fever since arriving back from [facility]





History of Present Illness


The patient is a 22 year old female who presents to the Emergency Room with 

complaints of abdominal pain and vomiting.  The patient reports that she has 

had lower abdominal pain and vomiting for the past 2 days since returning from 

[facility].  She reports that she has had hot and cold flashes but has not taken her 

temperature.  Patient does report she has not had a menstrual period in 6 

months and has taken 2 pregnancy tests which were both negative.  The patient 

is very concerned because she has a history of schizophrenia and has been 

unable to keep her meds down.  She reports she did not receive any vaccines 

prior to traveling to [facility].  She does report she has had some body aches.  

She rates her overall discomfort a 6/10.  She denies a history of abdominal 

issues or abdominal surgery.  She denies urinary symptoms, abnormal vaginal 

discharge, diarrhea or constipation.





Review of Systems


A complete 10 point review of systems was reviewed with the patient with 

pertinent positives and negatives as per history of present illness. All else 

were negative.





Past Medical/Surgical History


Medical Problems:


(1) Depression


(2) Schizophrenia


Social History Problems:


(1) Sexually active








Family History





Patient reports no known family medical history.





Social History


Smoking Status:  Never Smoker


Drug Use:  none


Marital Status:  single


Housing Status:  lives with roommate


Occupation Status:  [employer] student





Current/Historical Medications


Scheduled


Citalopram Hydrobromide (Citalopram Hydrobromide), 10 MG PO HS


Ondasetron Odt (Zofran Odt), 4 MG SL Q6H


Quetiapine Fumarate (Seroquel Xr), 300 MG PO HS





Physical Exam


Vital Signs











  Date Time  Temp Pulse Resp B/P (MAP) Pulse Ox O2 Delivery O2 Flow Rate FiO2


 


1/12/18 16:35  108 16 96/57 99   


 


1/12/18 14:50  117 17 111/80 99 Room Air  


 


1/12/18 14:00 36.3 129 16 118/77 98   











Physical Exam


VITALS: Vitals are noted on the nurse's note and reviewed by myself.  Vital 

signs stable.


GENERAL: This is a 22-year-old female, in no acute distress, nondiaphoretic, 

well-developed well-nourished.


HEENT: Normocephalic.  PERRLA.  EOMI.  Nares patent.  Mucous membranes moist.  

Neck is supple without nuchal rigidity.


HEART: Regular rate and rhythm without murmurs gallops or rubs.


LUNGS: Clear to auscultation bilaterally without wheezes, rales or rhonchi. 


ABDOMEN: Positive bowel sounds x 4.  Soft, tenderness to palpation in the right 

lower quadrant.  No guarding or rebound tenderness.


NEURO: Patient was alert and oriented to person place and time.





Medical Decision & Procedures


ER Provider


Diagnostic Interpretation:


ABD/PELVIS IV CONTRAST ONLY





FINDINGS: Lung bases are clear. Liver spleen and pancreas enhance uniformly.


Kidneys negative for hydronephrosis.





There are several mesenteric and right pericecal nodes. The appendix is


identified and appears normal. There is no evidence for abscess collection or


obstruction.





There are several loops of fluid-filled small bowel in the right central pelvic


and lower abdominal region. There is suggestion mild enteritis. No free fluid


within the cul-de-sac. LAD is midline. The uterus is anteflexed.





IMPRESSION: 





1. Normal appendix.





2. Nonobstructive bowel pattern.





3. Findings consistent with mild mesenteric adenitis with superimposed mild


enteritis





Laboratory Results


1/12/18 14:35








Red Blood Count 4.64, Mean Corpuscular Volume 86.2, Mean Corpuscular Hemoglobin 

29.1, Mean Corpuscular Hemoglobin Concent 33.8, Mean Platelet Volume 9.1, 

Neutrophils (%) (Auto) 87.3, Lymphocytes (%) (Auto) 5.9, Monocytes (%) (Auto) 

6.0, Eosinophils (%) (Auto) 0.7, Basophils (%) (Auto) 0.0, Neutrophils # (Auto) 

9.10, Lymphocytes # (Auto) 0.61, Monocytes # (Auto) 0.62, Eosinophils # (Auto) 

0.07, Basophils # (Auto) 0.00





1/12/18 14:35

















Test


  1/12/18


14:35 1/12/18


14:40 1/12/18


15:45


 


White Blood Count


  10.41 K/uL


(4.8-10.8) 


  


 


 


Red Blood Count


  4.64 M/uL


(4.2-5.4) 


  


 


 


Hemoglobin


  13.5 g/dL


(12.0-16.0) 


  


 


 


Hematocrit 40.0 % (37-47)   


 


Mean Corpuscular Volume


  86.2 fL


() 


  


 


 


Mean Corpuscular Hemoglobin


  29.1 pg


(25-34) 


  


 


 


Mean Corpuscular Hemoglobin


Concent 33.8 g/dl


(32-36) 


  


 


 


Platelet Count


  267 K/uL


(130-400) 


  


 


 


Mean Platelet Volume


  9.1 fL


(7.4-10.4) 


  


 


 


Neutrophils (%) (Auto) 87.3 %   


 


Lymphocytes (%) (Auto) 5.9 %   


 


Monocytes (%) (Auto) 6.0 %   


 


Eosinophils (%) (Auto) 0.7 %   


 


Basophils (%) (Auto) 0.0 %   


 


Neutrophils # (Auto)


  9.10 K/uL


(1.4-6.5) 


  


 


 


Lymphocytes # (Auto)


  0.61 K/uL


(1.2-3.4) 


  


 


 


Monocytes # (Auto)


  0.62 K/uL


(0.11-0.59) 


  


 


 


Eosinophils # (Auto)


  0.07 K/uL


(0-0.5) 


  


 


 


Basophils # (Auto)


  0.00 K/uL


(0-0.2) 


  


 


 


RDW Standard Deviation


  42.7 fL


(36.4-46.3) 


  


 


 


RDW Coefficient of Variation


  13.5 %


(11.5-14.5) 


  


 


 


Immature Granulocyte % (Auto) 0.1 %   


 


Immature Granulocyte # (Auto)


  0.01 K/uL


(0.00-0.02) 


  


 


 


Anion Gap


  4.0 mmol/L


(3-11) 


  


 


 


Est Creatinine Clear Calc


Drug Dose 118.9 ml/min 


  


  


 


 


Estimated GFR (


American) 128.1 


  


  


 


 


Estimated GFR (Non-


American 110.6 


  


  


 


 


BUN/Creatinine Ratio 16.1 (10-20)   


 


Calcium Level


  8.4 mg/dl


(8.5-10.1) 


  


 


 


Total Bilirubin


  0.5 mg/dl


(0.2-1) 


  


 


 


Aspartate Amino Transf


(AST/SGOT) 14 U/L (15-37) 


  


  


 


 


Alanine Aminotransferase


(ALT/SGPT) 24 U/L (12-78) 


  


  


 


 


Alkaline Phosphatase


  80 U/L


() 


  


 


 


Total Protein


  7.8 gm/dl


(6.4-8.2) 


  


 


 


Albumin


  3.8 gm/dl


(3.4-5.0) 


  


 


 


Globulin


  4.0 gm/dl


(2.5-4.0) 


  


 


 


Albumin/Globulin Ratio 1.0 (0.9-2)   


 


Lipase


  85 U/L


() 


  


 


 


Influenza Type A Antigen


  


  Neg for Influ


A (NEG) 


 


 


Influenza Type B Antigen


  


  Neg for Influ


B (NEG) 


 


 


Urine Color   YELLOW 


 


Urine Appearance   CLEAR (CLEAR) 


 


Urine pH   7.0 (4.5-7.5) 


 


Urine Specific Gravity


  


  


  1.025


(1.000-1.030)


 


Urine Protein   NEG (NEG) 


 


Urine Glucose (UA)   NEG (NEG) 


 


Urine Ketones   TRACE (NEG) 


 


Urine Occult Blood   NEG (NEG) 


 


Urine Nitrite   NEG (NEG) 


 


Urine Bilirubin   NEG (NEG) 


 


Urine Urobilinogen   NEG (NEG) 


 


Urine Leukocyte Esterase   SMALL (NEG) 


 


Urine WBC (Auto)   1-5 /hpf (0-5) 


 


Urine RBC (Auto)   0-4 /hpf (0-4) 


 


Urine Hyaline Casts (Auto)   0 /lpf (0-5) 


 


Urine Epithelial Cells (Auto)   >30 /lpf (0-5) 


 


Urine Bacteria (Auto)   NEG (NEG) 


 


Urine Pregnancy Test   NEG (NEG) 











Medications Administered











 Medications


  (Trade)  Dose


 Ordered  Sig/Chaitanya


 Route  Start Time


 Stop Time Status Last Admin


Dose Admin


 


 Sodium Chloride  1,000 ml @ 


 999 mls/hr  Q1H1M STAT


 IV  1/12/18 14:30


 1/12/18 15:30 DC 1/12/18 14:50


999 MLS/HR


 


 Ondansetron HCl


  (Zofran Inj)  4 mg  NOW  STAT


 IV  1/12/18 14:30


 1/12/18 14:32 DC 1/12/18 14:50


4 MG











ED Course


The patient was evaluated as above.  Labs were drawn and IV access was 

obtained.  





Patient was medicated with 1 L normal saline solution and 4 mg Zofran.





CT of the abdomen and pelvis was performed and read by radiology as above. 





Patient was reevaluated and was feeling much better.  Findings were discussed 

with the patient.





Discharge instructions were reviewed with the patient.  The patient verbalized 

understanding of my assessment and treatment plan and was discharged home in 

good condition.





Medical Decision


Differential diagnosis includes appendicitis, gastritis, colitis, pelvic 

inflammatory disease, urinary tract infection, ovarian cyst, ovarian torsion, 

among others.





The patient is a 22-year-old female who presents today complaining of abdominal 

pain and vomiting.  Labs revealed no leukocytosis or concerning electrolyte 

abnormalities.  Urinalysis was not suggestive of infection.  Urine pregnancy 

was negative.  Influenza testing was negative.  CT scan of the abdomen and 

pelvis was performed and showed findings consistent with mild mesenteric 

adenitis/enteritis.  The patient was reassured and treatment plan was 

discussed.  She will be given a prescription for Zofran and was encouraged to 

drink plenty of fluids.  This is likely secondary to a viral illness.  The 

patient was encouraged to follow-up with her primary care provider for a 

recheck and return here for any worsening symptoms or as needed.





The patient's case was reviewed with Dr. Enriquez, ED attending physician, who 

agreed with my assessment and treatment plan.





Based on the patient's presentation and work up, I feel the patient is stable 

for outpatient treatment.  The patient was educated to return to the emergency 

department for any worsening of their current condition or new/concerning 

symptoms.  She will follow up with her PCP.





Medication Reconcilliation


Current Medication List:  was personally reviewed by me





Blood Pressure Screening


Patient's blood pressure:  Normal blood pressure





Impression





 Primary Impression:  


 Mesenteric adenitis





Departure Information


Dispostion


Home / Self-Care





Condition


GOOD





Prescriptions





Ondasetron Odt (ZOFRAN ODT) 4 Mg Tab


4 MG SL Q6H for Nausea, #15 TAB


   Prov: Gem King ., EVELINE         1/12/18





Referrals


No Doctor, Assigned (PCP)





Patient Instructions


My [facility]





Additional Instructions





You have been treated in the Emergency Department for your Abdominal Pain. 

Laboratory results and imaging studies have ruled out any emergent causes for 

your abdominal pain which would warrant admission or surgery. 





CT scan of your abdomen did show some swollen lymph nodes within your abdomen 

which usually represent a viral infection.





You have been prescribed Zofran to be used for any nausea or vomiting. Take as 

prescribed.





For pain control, you can use the following over-the-counter medicines (if >11 yo):





- Regular strength (325mg/tab) Tylenol (acetaminophen) 2 tabs every 4-6 hours 

as needed. Do not exceed 12 tablets in a 24 hour period. Avoid taking more than 

4 grams (4000 mg) of Tylenol per day. This includes any other sources of 

acetaminophen you may take on a regular basis.





- Regular strength (200 mg/tab) Advil (ibuprofen) 1-2 tabs every 4-6 hours as 

needed. Do not exceed a dose of 3200 mg per day.





Drink plenty of water and stay well hydrated. 





As with any trip to the Emergency Department, you should follow-up with your 

Primary Care Provider from today's visit.





Return to the emergency department if your symptoms persist despite treatment 

plan outlined above or if the following symptoms occur: Worsening pain, 

uncontrolled vomiting, high fevers, dizziness/lightheadedness, or any other new/

concerning symptoms.

## 2018-01-12 NOTE — DIAGNOSTIC IMAGING REPORT
ABD/PELVIS IV CONTRAST ONLY



CT DOSE: 452.96 mGy.cm



HISTORY: Pain. Nausea.  RLQ abd pain, vomiting



TECHNIQUE: Multiaxial CT images of the abdomen and pelvis were performed

following the use of intravenous contrast.  A dose lowering technique was

utilized adhering to the principles of ALARA.





COMPARISON STUDY: None.



FINDINGS: Lung bases are clear. Liver spleen and pancreas enhance uniformly.

Kidneys negative for hydronephrosis.



There are several mesenteric and right pericecal nodes. The appendix is

identified and appears normal. There is no evidence for abscess collection or

obstruction.



There are several loops of fluid-filled small bowel in the right central pelvic

and lower abdominal region. There is suggestion mild enteritis. No free fluid

within the cul-de-sac. LAD is midline. The uterus is anteflexed.



IMPRESSION: 



1. Normal appendix.





2. Nonobstructive bowel pattern.





3. Findings consistent with mild mesenteric adenitis with superimposed mild

enteritis 







The above report was generated using voice recognition software.  It may contain

grammatical, syntax or spelling errors.







Electronically signed by:  Karl Hernandez M.D.

1/12/2018 4:01 PM



Dictated Date/Time:  1/12/2018 3:59 PM

## 2018-03-01 ENCOUNTER — HOSPITAL ENCOUNTER (EMERGENCY)
Dept: HOSPITAL 45 - C.EDB | Age: 23
Discharge: PSYCHIATRIC OR SUBSTANCE USE FACILITY | End: 2018-03-01
Payer: COMMERCIAL

## 2018-03-01 VITALS — OXYGEN SATURATION: 100 % | DIASTOLIC BLOOD PRESSURE: 75 MMHG | HEART RATE: 118 BPM | SYSTOLIC BLOOD PRESSURE: 121 MMHG

## 2018-03-01 VITALS
BODY MASS INDEX: 27.66 KG/M2 | BODY MASS INDEX: 27.66 KG/M2 | HEIGHT: 65 IN | WEIGHT: 166.01 LBS | WEIGHT: 166.01 LBS | HEIGHT: 65 IN

## 2018-03-01 VITALS — TEMPERATURE: 98.06 F

## 2018-03-01 DIAGNOSIS — R44.3: ICD-10-CM

## 2018-03-01 DIAGNOSIS — F20.9: ICD-10-CM

## 2018-03-01 DIAGNOSIS — F32.9: ICD-10-CM

## 2018-03-01 DIAGNOSIS — R45.851: Primary | ICD-10-CM

## 2018-03-01 LAB
ALBUMIN SERPL-MCNC: 3.9 GM/DL (ref 3.4–5)
ALP SERPL-CCNC: 86 U/L (ref 45–117)
ALT SERPL-CCNC: 23 U/L (ref 12–78)
AST SERPL-CCNC: 12 U/L (ref 15–37)
BASOPHILS # BLD: 0.01 K/UL (ref 0–0.2)
BASOPHILS NFR BLD: 0.1 %
BUN SERPL-MCNC: 12 MG/DL (ref 7–18)
CALCIUM SERPL-MCNC: 8.7 MG/DL (ref 8.5–10.1)
CO2 SERPL-SCNC: 28 MMOL/L (ref 21–32)
CREAT SERPL-MCNC: 0.77 MG/DL (ref 0.6–1.2)
EOS ABS #: 0.24 K/UL (ref 0–0.5)
EOSINOPHIL NFR BLD AUTO: 276 K/UL (ref 130–400)
GLUCOSE SERPL-MCNC: 101 MG/DL (ref 70–99)
HCT VFR BLD CALC: 39.7 % (ref 37–47)
HGB BLD-MCNC: 13.9 G/DL (ref 12–16)
IG#: 0.01 K/UL (ref 0–0.02)
IMM GRANULOCYTES NFR BLD AUTO: 37.9 %
LYMPHOCYTES # BLD: 2.61 K/UL (ref 1.2–3.4)
MCH RBC QN AUTO: 29.9 PG (ref 25–34)
MCHC RBC AUTO-ENTMCNC: 35 G/DL (ref 32–36)
MCV RBC AUTO: 85.4 FL (ref 80–100)
MONO ABS #: 0.81 K/UL (ref 0.11–0.59)
MONOCYTES NFR BLD: 11.8 %
NEUT ABS #: 3.21 K/UL (ref 1.4–6.5)
NEUTROPHILS # BLD AUTO: 3.5 %
NEUTROPHILS NFR BLD AUTO: 46.6 %
PMV BLD AUTO: 8.8 FL (ref 7.4–10.4)
POTASSIUM SERPL-SCNC: 3.9 MMOL/L (ref 3.5–5.1)
PROT SERPL-MCNC: 7.9 GM/DL (ref 6.4–8.2)
RED CELL DISTRIBUTION WIDTH CV: 12.7 % (ref 11.5–14.5)
RED CELL DISTRIBUTION WIDTH SD: 39.3 FL (ref 36.4–46.3)
SODIUM SERPL-SCNC: 137 MMOL/L (ref 136–145)
WBC # BLD AUTO: 6.89 K/UL (ref 4.8–10.8)

## 2018-03-01 RX ADMIN — BENZONATATE ONE MG: 100 CAPSULE ORAL at 06:56

## 2018-03-01 NOTE — EMERGENCY ROOM VISIT NOTE
History


Report prepared by Katherine:  Fawn Jama


Under the Supervision of:  Dr. Marcellus Kapoor M.D.


First contact with patient:  00:15


Chief Complaint:  MENTAL HEALTH EVALUATION


Stated Complaint:  SCHIZOPHRENIA-SUICIDAL -DEPRESSION





History of Present Illness


The patient is a 22 year old female who presents to the Emergency Room for a 

mental health evaluation for worsening suicidal ideation that began one week 

ago. The patient states that she has stopped taking her schizophrenia medicine, 

Seroquel, because she ran out one week, noting that they have not been working 

lately. She reports that she has been having increased visual and auditory 

hallucinations who have been telling her to kill herself, noting she does not 

have a plan. She states that she has been feeling depressed recently, but has 

not been hurting herself and notes that no one has been hurting her. The 

patient notes that she has had a suicide attempt in the past and does not feel 

safe at home. She denies vomiting, or any relationship or job related issues.





   Source of History:  patient


   Onset:  one week ago


   Position:  other (mental)


   Quality:  other (suicidal ideations)


   Timing:  other (persistent)


   Associated Symptoms:  No vomiting


Note:


Associated symptoms: suicidal ideations and auditory/visual hallucinations.





Review of Systems


See HPI for pertinent positives & negatives. A total of 10 systems reviewed and 

were otherwise negative.





Past Medical & Surgical


Medical Problems:


(1) Depression


(2) Schizophrenia


Social History Problems:


(1) Sexually active








Family History





Patient reports no known family medical history.





Social History


Smoking Status:  Never Smoker


Drug Use:  none


Marital Status:  single


Housing Status:  lives with roommate


Occupation Status:  [employer] student





Current/Historical Medications


Scheduled


Citalopram Hydrobromide (Citalopram Hydrobromide), 10 MG PO HS


Quetiapine Fumarate (Seroquel Xr), 300 MG PO HS





Allergies


Coded Allergies:  


     Shellfish (Verified  Allergy, Severe, flushed and sob, 3/1/18)


 HAS USED EPIPEN WITH REACTION


     Risperidone (Verified  Allergy, Mild, Drops BP, 3/1/18)





Physical Exam


Vital Signs











  Date Time  Temp Pulse Resp B/P (MAP) Pulse Ox O2 Delivery O2 Flow Rate FiO2


 


3/1/18 03:37 36.7 76 20 120/81 97 Room Air  


 


3/1/18 00:07 36.0 76 20 118/78 97 Room Air  











Physical Exam


GENERAL: Patient is well appearing and in no acute distress.


HEENT: No acute trauma, normocephalic atraumatic, mucous membranes moist, no 

nasal congestion, no scleral icterus.


NECK: No stridor, no adenopathy, no meningismus, trachea is midline.


LUNGS: No dyspnea. Clear to auscultation and equal bilaterally. No wheeze, no 

rhonchi.


HEART: Regular rate and rhythm.  No murmurs, rubs, gallops appreciated.


ABDOMEN: Soft, nontender, bowel sounds positive, no masses appreciated, no 

peritonitis.


BACK: No midline tenderness, no CVA tenderness


EXTREMITIES: Normal motion all extremities, no cyanosis, no edema.


NEUROLOGIC: Alert and oriented, no acute motor or sensory deficits, no focal 

weakness, cranial nerves grossly intact.


SKIN: No rash, no jaundice, no diaphoresis.


PSYCH: Admits to suicidal ideations and auditory and visual hallucinations.





Medical Decision & Procedures


Laboratory Results


3/1/18 00:30








Red Blood Count 4.65, Mean Corpuscular Volume 85.4, Mean Corpuscular Hemoglobin 

29.9, Mean Corpuscular Hemoglobin Concent 35.0, Mean Platelet Volume 8.8, 

Neutrophils (%) (Auto) 46.6, Lymphocytes (%) (Auto) 37.9, Monocytes (%) (Auto) 

11.8, Eosinophils (%) (Auto) 3.5, Basophils (%) (Auto) 0.1, Neutrophils # (Auto

) 3.21, Lymphocytes # (Auto) 2.61, Monocytes # (Auto) 0.81, Eosinophils # (Auto

) 0.24, Basophils # (Auto) 0.01





3/1/18 00:30

















Test


  3/1/18


00:30 3/1/18


01:10


 


White Blood Count


  6.89 K/uL


(4.8-10.8) 


 


 


Red Blood Count


  4.65 M/uL


(4.2-5.4) 


 


 


Hemoglobin


  13.9 g/dL


(12.0-16.0) 


 


 


Hematocrit 39.7 % (37-47)  


 


Mean Corpuscular Volume


  85.4 fL


() 


 


 


Mean Corpuscular Hemoglobin


  29.9 pg


(25-34) 


 


 


Mean Corpuscular Hemoglobin


Concent 35.0 g/dl


(32-36) 


 


 


Platelet Count


  276 K/uL


(130-400) 


 


 


Mean Platelet Volume


  8.8 fL


(7.4-10.4) 


 


 


Neutrophils (%) (Auto) 46.6 %  


 


Lymphocytes (%) (Auto) 37.9 %  


 


Monocytes (%) (Auto) 11.8 %  


 


Eosinophils (%) (Auto) 3.5 %  


 


Basophils (%) (Auto) 0.1 %  


 


Neutrophils # (Auto)


  3.21 K/uL


(1.4-6.5) 


 


 


Lymphocytes # (Auto)


  2.61 K/uL


(1.2-3.4) 


 


 


Monocytes # (Auto)


  0.81 K/uL


(0.11-0.59) 


 


 


Eosinophils # (Auto)


  0.24 K/uL


(0-0.5) 


 


 


Basophils # (Auto)


  0.01 K/uL


(0-0.2) 


 


 


RDW Standard Deviation


  39.3 fL


(36.4-46.3) 


 


 


RDW Coefficient of Variation


  12.7 %


(11.5-14.5) 


 


 


Immature Granulocyte % (Auto) 0.1 %  


 


Immature Granulocyte # (Auto)


  0.01 K/uL


(0.00-0.02) 


 


 


Anion Gap


  5.0 mmol/L


(3-11) 


 


 


Est Creatinine Clear Calc


Drug Dose 115.4 ml/min 


  


 


 


Estimated GFR (


American) 126.1 


  


 


 


Estimated GFR (Non-


American 108.8 


  


 


 


BUN/Creatinine Ratio 15.6 (10-20)  


 


Calcium Level


  8.7 mg/dl


(8.5-10.1) 


 


 


Total Bilirubin


  0.4 mg/dl


(0.2-1) 


 


 


Aspartate Amino Transf


(AST/SGOT) 12 U/L (15-37) 


  


 


 


Alanine Aminotransferase


(ALT/SGPT) 23 U/L (12-78) 


  


 


 


Alkaline Phosphatase


  86 U/L


() 


 


 


Total Protein


  7.9 gm/dl


(6.4-8.2) 


 


 


Albumin


  3.9 gm/dl


(3.4-5.0) 


 


 


Globulin


  4.0 gm/dl


(2.5-4.0) 


 


 


Albumin/Globulin Ratio 1.0 (0.9-2)  


 


Thyroid Stimulating Hormone


(TSH) 0.810 uIu/ml


(0.300-4.500) 


 


 


Salicylates Level


  < 1.7 mg/dl


(2.8-20) 


 


 


Acetaminophen Level


  2 ug/ml


(10-30) 


 


 


Ethyl Alcohol mg/dL


  < 3.0 mg/dl


(0-3) 


 


 


Urine Color  YELLOW 


 


Urine Appearance  TURBID (CLEAR) 


 


Urine pH  8.5 (4.5-7.5) 


 


Urine Specific Gravity


  


  1.038


(1.000-1.030)


 


Urine Protein  NEG (NEG) 


 


Urine Glucose (UA)  NEG (NEG) 


 


Urine Ketones  TRACE (NEG) 


 


Urine Occult Blood  NEG (NEG) 


 


Urine Nitrite  NEG (NEG) 


 


Urine Bilirubin  NEG (NEG) 


 


Urine Urobilinogen  NEG (NEG) 


 


Urine Leukocyte Esterase  TRACE (NEG) 


 


Urine WBC (Auto)  1-5 /hpf (0-5) 


 


Urine RBC (Auto)  0-4 /hpf (0-4) 


 


Urine Hyaline Casts (Auto)  0 /lpf (0-5) 


 


Urine Epithelial Cells (Auto)  >30 /lpf (0-5) 


 


Urine Bacteria (Auto)  NEG (NEG) 


 


Urine Pregnancy Test  NEG (NEG) 


 


Urine Opiates Screen  NEG (NEG) 


 


Urine Methadone, Qualitative  NEG (NEG) 


 


Urine Barbiturates  NEG (NEG) 


 


Urine Phencyclidine (PCP)


Level 


  NEG (NEG) 


 


 


Ur


Amphetamine/Methamphetamine 


  NEG (NEG) 


 


 


MDMA (Ecstasy) Screen  NEG (NEG) 


 


Urine Benzodiazepines Screen  NEG (NEG) 


 


Urine Cocaine Metabolite  NEG (NEG) 


 


Urine Marijuana (THC)  NEG (NEG) 





Laboratory results as reviewed by me.





ED Course


0021: The patient was evaluated in room A7. A complete history and physical 

exam was performed.





0221: I reevaluated the patient, who is resting comfortably and has no 

requests. 





0641: The patient was accepted as an inpatient at [facility]. Awaiting 

transportation.





Medical Decision


Etiologies such as mood disorder, infection, hypoglycemia, electrolyte 

abnormalities, cardiac sources, intracerebral event, toxicologic, neurologic, 

as well as others were entertained.





22 yr old female arrives for evaluation of suicidal ideation and worsening 

hallucinations.  Voluntary and wishing admission as she is worried she may harm 

herself.  She does not feel safe going home as she thinks she may kill herself.

  Admits being off her Seroquel for the last week as she felt it was 

ineffective and she had run out of rx.  She has no evidence nor reason to 

suspect active overdose.  She is stable, medically clear.  Signed 201.  No 

issues throughout the night.  She was accepted to [facility].





Medication Reconcilliation


Current Medication List:  was personally reviewed by me





Blood Pressure Screening


Patient's blood pressure:  Normal blood pressure


Blood pressure disposition:  Did not require urgent referral





Impression





 Primary Impression:  


 Suicidal ideation


 Additional Impressions:  


 Depression


 Hallucinations





Scribe Attestation


The scribe's documentation has been prepared under my direction and personally 

reviewed by me in its entirety. I confirm that the note above accurately 

reflects all work, treatment, procedures, and medical decision making performed 

by me.





Departure Information


Dispostion


Still a Patient





Referrals


No Doctor, Assigned (PCP)





Forms


HOME CARE DOCUMENTATION FORM,                                                 

               IMPORTANT VISIT INFORMATION





Patient Instructions


My [facility]





Problem Qualifiers